# Patient Record
Sex: FEMALE | URBAN - METROPOLITAN AREA
[De-identification: names, ages, dates, MRNs, and addresses within clinical notes are randomized per-mention and may not be internally consistent; named-entity substitution may affect disease eponyms.]

---

## 2024-01-04 ENCOUNTER — APPOINTMENT (RX ONLY)
Dept: URBAN - METROPOLITAN AREA CLINIC 111 | Facility: CLINIC | Age: 69
Setting detail: DERMATOLOGY
End: 2024-01-04

## 2024-01-04 ENCOUNTER — RX ONLY (OUTPATIENT)
Age: 69
Setting detail: RX ONLY
End: 2024-01-04

## 2024-01-04 DIAGNOSIS — L24 IRRITANT CONTACT DERMATITIS: ICD-10-CM

## 2024-01-04 DIAGNOSIS — L82.1 OTHER SEBORRHEIC KERATOSIS: ICD-10-CM

## 2024-01-04 DIAGNOSIS — L91.8 OTHER HYPERTROPHIC DISORDERS OF THE SKIN: ICD-10-CM

## 2024-01-04 PROBLEM — L24.9 IRRITANT CONTACT DERMATITIS, UNSPECIFIED CAUSE: Status: ACTIVE | Noted: 2024-01-04

## 2024-01-04 PROCEDURE — ? COUNSELING

## 2024-01-04 PROCEDURE — ? SKIN TAG REMOVAL

## 2024-01-04 PROCEDURE — ? PRESCRIPTION MEDICATION MANAGEMENT

## 2024-01-04 PROCEDURE — 99203 OFFICE O/P NEW LOW 30 MIN: CPT | Mod: 25

## 2024-01-04 PROCEDURE — ? ORDER FOR PATCH TESTING

## 2024-01-04 PROCEDURE — ? PRESCRIPTION

## 2024-01-04 PROCEDURE — ? DIAGNOSIS COMMENT

## 2024-01-04 PROCEDURE — 11200 RMVL SKIN TAGS UP TO&INC 15: CPT

## 2024-01-04 RX ORDER — PIMECROLIMUS 10 MG/G
CREAM TOPICAL
Qty: 30 | Refills: 1 | Status: CANCELLED | COMMUNITY
Start: 2024-01-04

## 2024-01-04 RX ORDER — TACROLIMUS 1 MG/G
OINTMENT TOPICAL
Qty: 30 | Refills: 1 | Status: ERX | COMMUNITY
Start: 2024-01-04

## 2024-01-04 RX ADMIN — PIMECROLIMUS: 10 CREAM TOPICAL at 00:00

## 2024-01-04 ASSESSMENT — LOCATION DETAILED DESCRIPTION DERM
LOCATION DETAILED: RIGHT INFERIOR ANTERIOR NECK
LOCATION DETAILED: RIGHT MEDIAL SUPERIOR EYELID
LOCATION DETAILED: RIGHT INFERIOR LATERAL NECK
LOCATION DETAILED: LEFT LATERAL MALAR CHEEK
LOCATION DETAILED: LEFT INFERIOR ANTERIOR NECK
LOCATION DETAILED: LEFT INFERIOR LATERAL NECK
LOCATION DETAILED: RIGHT INFERIOR CENTRAL MALAR CHEEK

## 2024-01-04 ASSESSMENT — LOCATION ZONE DERM
LOCATION ZONE: NECK
LOCATION ZONE: EYELID
LOCATION ZONE: FACE

## 2024-01-04 ASSESSMENT — LOCATION SIMPLE DESCRIPTION DERM
LOCATION SIMPLE: RIGHT SUPERIOR EYELID
LOCATION SIMPLE: LEFT CHEEK
LOCATION SIMPLE: RIGHT ANTERIOR NECK
LOCATION SIMPLE: RIGHT CHEEK
LOCATION SIMPLE: LEFT ANTERIOR NECK

## 2024-01-04 NOTE — HPI: SKIN LESION
Is This A New Presentation, Or A Follow-Up?: Skin Lesions
What Type Of Note Output Would You Prefer (Optional)?: Bullet Format
How Severe Is Your Skin Lesion?: moderate
Has Your Skin Lesion Been Treated?: not been treated
Additional History: Patient would like to get skin tags removed.

## 2024-01-04 NOTE — PROCEDURE: DIAGNOSIS COMMENT
Render Risk Assessment In Note?: no
Comment: Recommended patient stop using her makeup removing wipes and double rinse in the shower. Plan to complete patch testing to determine irritants.
Detail Level: Simple

## 2024-01-04 NOTE — PROCEDURE: SKIN TAG REMOVAL
Medical Necessity Clause: This procedure was medically necessary because the lesions that were treated were:
Include Z78.9 (Other Specified Conditions Influencing Health Status) As An Associated Diagnosis?: No
Anesthesia Type: 1% lidocaine with epinephrine
Consent: Written consent obtained and the risks of skin tag removal was reviewed with the patient including but not limited to bleeding, pigmentary change, infection, pain, and remote possibility of scarring.
Add Associated Diagnoses If Applicable When Selecting Medical Necessity: Yes
Anesthesia Volume In Cc: 0.2
Medical Necessity Information: It is in your best interest to select a reason for this procedure from the list below. All of these items fulfill various CMS LCD requirements except the new and changing color options.
Hemostasis: Drysol
Detail Level: Detailed

## 2024-01-04 NOTE — HPI: RASH
What Type Of Note Output Would You Prefer (Optional)?: Bullet Format
How Severe Is Your Rash?: moderate
Is This A New Presentation, Or A Follow-Up?: Rash
Additional History: Patient reports no change in regimen. Patient previously moved to a brand new apartment in August with , did not have rash outbreak until November. \\n\\nPatient has seen allergist and was prescribed ointments with some improvement.

## 2024-01-04 NOTE — PROCEDURE: ORDER FOR PATCH TESTING
Detail Level: Simple
Counseling: I discussed the timing of the procedure and ensured the patient understands that this test requires multiple visits. No topical steroids applied should be applied to the patch testing location and no oral prednisone for two week prior to the test. While the patches are in place they should be kept dry which will limit bathing, swimming an exercise. I also explained that it is common for testing to be negative and this doesn't mean there isn't a allergic reaction occurring. During the testing itching is common.
Location Patches Should Be Applied: Back
Patch Test To Be Applied: [employer] American 80 Series
Patch Test Reading Schedule: First Reading at 48 hours and Second Reading at 72 hours

## 2024-01-04 NOTE — PROCEDURE: PRESCRIPTION MEDICATION MANAGEMENT
Initiate Treatment: -\\n-Elidel 1 % topical cream: Apply to the affected areas twice daily as needed for flares. If stinging occurs, refrigerate beforehand or dilute with CeraVe healing ointment.
Detail Level: Zone
Render In Strict Bullet Format?: No

## 2024-01-22 ENCOUNTER — APPOINTMENT (RX ONLY)
Dept: URBAN - METROPOLITAN AREA CLINIC 111 | Facility: CLINIC | Age: 69
Setting detail: DERMATOLOGY
End: 2024-01-22

## 2024-01-22 ENCOUNTER — RX ONLY (OUTPATIENT)
Age: 69
Setting detail: RX ONLY
End: 2024-01-22

## 2024-01-22 DIAGNOSIS — L24 IRRITANT CONTACT DERMATITIS: ICD-10-CM

## 2024-01-22 PROBLEM — L24.9 IRRITANT CONTACT DERMATITIS, UNSPECIFIED CAUSE: Status: ACTIVE | Noted: 2024-01-22

## 2024-01-22 PROCEDURE — ? COUNSELING

## 2024-01-22 PROCEDURE — 95044 PATCH/APPLICATION TESTS: CPT

## 2024-01-22 PROCEDURE — ? PATCH TESTING

## 2024-01-22 PROCEDURE — ? PRESCRIPTION MEDICATION MANAGEMENT

## 2024-01-22 RX ORDER — TACROLIMUS 1 MG/G
OINTMENT TOPICAL
Qty: 30 | Refills: 1 | Status: ERX

## 2024-01-24 ENCOUNTER — APPOINTMENT (RX ONLY)
Dept: URBAN - METROPOLITAN AREA CLINIC 111 | Facility: CLINIC | Age: 69
Setting detail: DERMATOLOGY
End: 2024-01-24

## 2024-01-24 DIAGNOSIS — L24 IRRITANT CONTACT DERMATITIS: ICD-10-CM

## 2024-01-24 PROBLEM — L24.9 IRRITANT CONTACT DERMATITIS, UNSPECIFIED CAUSE: Status: ACTIVE | Noted: 2024-01-24

## 2024-01-24 PROCEDURE — ? COUNSELING

## 2024-01-24 PROCEDURE — 99213 OFFICE O/P EST LOW 20 MIN: CPT

## 2024-01-24 PROCEDURE — ? PRESCRIPTION MEDICATION MANAGEMENT

## 2024-01-24 PROCEDURE — ? NORTH AMERICAN 80 PATCH TEST READING

## 2024-01-24 NOTE — PROCEDURE: PRESCRIPTION MEDICATION MANAGEMENT
Detail Level: Zone
Continue Regimen: -\\n- Elidel 1 % topical cream: Apply to the affected areas twice daily as needed for flares. If stinging occurs, refrigerate beforehand or dilute with CeraVe healing ointment.
Render In Strict Bullet Format?: No

## 2024-01-24 NOTE — PROCEDURE: [employer]ICAN 80 PATCH TEST READING
Name Of Allergen 8: carba mix
Name Of Allergen 43: Cobalt (II) chloride hexahydrate
Name Of Allergen 63: Iodopropynyl butyl carbamate
Allergen 49 Reaction: no reaction
Name Of Allergen 77: Formaldehyde
Name Of Allergen 31: Sesquiterpene lactone mix
Name Of Allergen 19: Myroxylon pereirae resin/(Balsam [place])
Name Of Allergen 70: Benzoyl peroxide
Name Of Allergen 50: Fragrance Mix II
Name Of Allergen 15: 7-lflz-Btqzojzogbojosgwkxxocgw resin
Name Of Allergen 3: Colophonium/(Colophony)
Name Of Allergen 38: Gold(I)sodium thiosulfate dihydrate
Name Of Allergen 72: Hydroxyisohexyl 3-Cyclohexene Carboxaldehyde (Lyral)
Name Of Allergen 26: Paraben mix
Name Of Allergen 57: Cananga odorata oil/(Ylang-Ylang oil)
Name Of Allergen 45: B-033A Budesonide
Name Of Allergen 10: Thiuram mix
Name Of Allergen 65: Disperse Blue 106/124 Mix
Name Of Allergen 79: Propylene glycol
Detail Level: Zone
Name Of Allergen 33: Propolis
Name Of Allergen 21: Diazolidinylurea (Germall II)
Name Of Allergen 52: Benzyl salicylate
Allergen 27 Reaction: 1+
What Reading Time Point?: 48 hour
Name Of Allergen 11: Clobetasol-17-propionate
Name Of Allergen 66: Compositae Mix II
Name Of Allergen 46: Cocamide SEBASTIAN/(Coconut diethanolamide)
Name Of Allergen 80: Oleamidopropyl dimethylamine
Allergen 17 Reaction: +/-
Name Of Allergen 34: Benzophenone-3/ (2-Hydroxy-4-methoxybenzophenone)
Name Of Allergen 22: Tocopherol/ (DL alpha tocopherol)
Name Of Allergen 53: Decyl Glucoside
Show Negative Results In The Note?: No
Name Of Allergen 41: Toluenesulfonamide formaldehyde resin
Name Of Allergen 6: Cinnamal/(Cinnamic aldehyde)
Name Of Allergen 61: Desoximetasone
Name Of Allergen 75: Amidoamine
Name Of Allergen 29: Glutaral / (Glutaraldehyde)
Show Allergen Counseling In The Note?: Yes
Allergen 2 Counseling: Allergen information sheets including equivocals provided to patient.
Name Of Allergen 60: Hydroperoxides of Limonene
Name Of Allergen 13: Epoxy resin Bisphenol A
Name Of Allergen 68: Fusidic acid sodium salt
Name Of Allergen 48: Textile dye mix
Name Of Allergen 36: Ethyleneurea, melamine formaldehyde mix
Name Of Allergen 1: Benzocaine
Name Of Allergen 24: Mixed dialkyl thiourea
Name Of Allergen 55: Hema (2-Hydroxyethyl methacrylate)
Name Of Allergen 69: Dibucaine hydrochloride
Name Of Allergen 49: Tea Tree Oil
Name Of Allergen 14: Quaternium-15(Dowicil 200)/(1-(30Chloroallyl)-3,5,5-cmvthc-2-azoniaadamantane chloride)
Name Of Allergen 37: 2-tert-Butyl-4-methoxyphenol (BHA)
Name Of Allergen 2: 2-Mercaptobenzothiazole(MBT)
Name Of Allergen 71: Isoamyl-p-methoxycinnamate
Name Of Allergen 25: Disperse Orange 3
Name Of Allergen 56: DMDM Hydantoin
Name Of Allergen 44: Tixocortol-21-pivalate
Name Of Allergen 9: Neomycin sulfate
Name Of Allergen 64: 2-n-Octyl-4-isothiazolin-3-one
Name Of Allergen 78: Methylisothiazolinone + Methylchloroisothiazolinone/(Cl+-Me-isothiazolinone(Arya )
Name Of Allergen 32: Thimerosal (Merthiolate)
Name Of Allergen 20: Nickelsulfate hexahydrate
Name Of Allergen 51: Disperse Yellow 3
Name Of Allergen 16: Mercapto mix
Name Of Allergen 4: p-Phenylenediamine(PPD)
Name Of Allergen 39: Ethyl acrylate
Name Of Allergen 73: Ethylhexyl Salicylate
Name Of Allergen 27: Methyldibromo glutaronitrile (MDBGN)
Name Of Allergen 58: Benzyl alcohol
Name Of Allergen 17: N,N-Diphenylguanidine
Name Of Allergen 5: Imidazolidinyl urea Germall 115
Name Of Allergen 40: Glyceryl monothioglycolate (GMTG)
Name Of Allergen 74: Hydroperoxides of Linalool
Name Of Allergen 28: Fragrance mix
Number Of Patches Read: 80
Name Of Allergen 59: Isopropyl myristate
Name Of Allergen 12: Ethylenediamine dihydrochloride
Name Of Allergen 67: Lidocaine
Name Of Allergen 47: Triethanolamine
Allergen 1 Counseling: Patch test safe list emailed to patient with positive reactions.
Name Of Allergen 35: Chloroxylenol (PCMX) / (4-Chloro-3,5-xylenol(PCMX)
Name Of Allergen 23: Bacitracin
Name Of Allergen 54: Methylisothiazolinone
Name Of Allergen 7: Amerchol L101
Name Of Allergen 42: Methyl methacrylate
Name Of Allergen 62: Polysorbate 80 / (Polyoxyethylenesorbitanmonooleate(Tween 80)
Name Of Allergen 76: Cocamidopropylbetaine
Name Of Allergen 30: 2-Bromo-2-nitropropane-l,3-diol (Brononol)
Name Of Allergen 18: Potassium dichromate

## 2024-01-26 ENCOUNTER — APPOINTMENT (RX ONLY)
Dept: URBAN - METROPOLITAN AREA CLINIC 111 | Facility: CLINIC | Age: 69
Setting detail: DERMATOLOGY
End: 2024-01-26

## 2024-01-26 DIAGNOSIS — L24 IRRITANT CONTACT DERMATITIS: ICD-10-CM

## 2024-01-26 PROBLEM — L24.9 IRRITANT CONTACT DERMATITIS, UNSPECIFIED CAUSE: Status: ACTIVE | Noted: 2024-01-26

## 2024-01-26 PROCEDURE — ? COUNSELING

## 2024-01-26 PROCEDURE — 99213 OFFICE O/P EST LOW 20 MIN: CPT

## 2024-01-26 PROCEDURE — ? NORTH AMERICAN 80 PATCH TEST READING

## 2024-01-26 PROCEDURE — ? PRESCRIPTION MEDICATION MANAGEMENT

## 2024-01-26 NOTE — PROCEDURE: MIPS QUALITY
Quality 130: Documentation Of Current Medications In The Medical Record: Current Medications Documented
Detail Level: Detailed
Quality 111:Pneumonia Vaccination Status For Older Adults: Patient did not receive any pneumococcal conjugate or polysaccharide vaccine on or after their 60th birthday and before the end of the measurement period
Quality 47: Advance Care Plan: Advance Care Planning discussed and documented; advance care plan or surrogate decision maker documented in the medical record.
Quality 110: Preventive Care And Screening: Influenza Immunization: Influenza Immunization Administered during Influenza season

## 2024-01-26 NOTE — PROCEDURE: [employer] 80 PATCH TEST READING
Name Of Allergen 29: Glutaral / (Glutaraldehyde)
Name Of Allergen 6: Cinnamal/(Cinnamic aldehyde)
Allergen 47 Reaction: no reaction
Name Of Allergen 35: Chloroxylenol (PCMX) / (4-Chloro-3,5-xylenol(PCMX)
Name Of Allergen 55: Hema (2-Hydroxyethyl methacrylate)
Show Negative Results In The Note?: No
Name Of Allergen 41: Toluenesulfonamide formaldehyde resin
Name Of Allergen 61: Desoximetasone
Name Of Allergen 67: Lidocaine
Name Of Allergen 47: Triethanolamine
Name Of Allergen 73: Ethylhexyl Salicylate
Number Of Patches Read: 80
Name Of Allergen 16: Mercapto mix
Name Of Allergen 22: Tocopherol/ (DL alpha tocopherol)
Name Of Allergen 78: Methylisothiazolinone + Methylchloroisothiazolinone/(Cl+-Me-isothiazolinone(Arya )
Name Of Allergen 28: Fragrance mix
Name Of Allergen 5: Imidazolidinyl urea Germall 115
Name Of Allergen 34: Benzophenone-3/ (2-Hydroxy-4-methoxybenzophenone)
Name Of Allergen 54: Methylisothiazolinone
What Reading Time Point?: 48 hour
Name Of Allergen 40: Glyceryl monothioglycolate (GMTG)
Name Of Allergen 60: Hydroperoxides of Limonene
Name Of Allergen 66: Compositae Mix II
Name Of Allergen 46: Cocamide SEBASTIAN/(Coconut diethanolamide)
Allergen 27 Reaction: 3+
Name Of Allergen 72: Hydroxyisohexyl 3-Cyclohexene Carboxaldehyde (Lyral)
Name Of Allergen 15: 0-uzyn-Oceykmjhnhtuwnxveudqnyc resin
Detail Level: Zone
Name Of Allergen 21: Diazolidinylurea (Germall II)
Name Of Allergen 77: Formaldehyde
Name Of Allergen 4: p-Phenylenediamine(PPD)
Name Of Allergen 27: Methyldibromo glutaronitrile (MDBGN)
Name Of Allergen 33: Propolis
Allergen 65 Reaction: +/-
Name Of Allergen 10: Thiuram mix
Name Of Allergen 59: Isopropyl myristate
Name Of Allergen 39: Ethyl acrylate
Name Of Allergen 45: B-033A Budesonide
Name Of Allergen 65: Disperse Blue 106/124 Mix
Name Of Allergen 71: Isoamyl-p-methoxycinnamate
Name Of Allergen 14: Quaternium-15(Dowicil 200)/(1-(30Chloroallyl)-3,5,4-hejjsp-5-azoniaadamantane chloride)
Name Of Allergen 50: Fragrance Mix II
Name Of Allergen 20: Nickelsulfate hexahydrate
Name Of Allergen 76: Cocamidopropylbetaine
Name Of Allergen 26: Paraben mix
Name Of Allergen 3: Colophonium/(Colophony)
Name Of Allergen 32: Thimerosal (Merthiolate)
Name Of Allergen 9: Neomycin sulfate
Name Of Allergen 58: Benzyl alcohol
Name Of Allergen 38: Gold(I)sodium thiosulfate dihydrate
Name Of Allergen 44: Tixocortol-21-pivalate
Name Of Allergen 64: 2-n-Octyl-4-isothiazolin-3-one
Name Of Allergen 70: Benzoyl peroxide
Name Of Allergen 51: Disperse Yellow 3
Name Of Allergen 13: Epoxy resin Bisphenol A
Allergen 2 Counseling: Allergen information sheets including equivocals provided to patient.
Name Of Allergen 19: Myroxylon pereirae resin/(Balsam [place])
Name Of Allergen 2: 2-Mercaptobenzothiazole(MBT)
Name Of Allergen 25: Disperse Orange 3
Allergen 1 Counseling: Patch test safe list emailed to patient with positive reactions.
Name Of Allergen 31: Sesquiterpene lactone mix
Name Of Allergen 8: carba mix
Name Of Allergen 57: Cananga odorata oil/(Ylang-Ylang oil)
Name Of Allergen 37: 2-tert-Butyl-4-methoxyphenol (BHA)
Name Of Allergen 63: Iodopropynyl butyl carbamate
Name Of Allergen 43: Cobalt (II) chloride hexahydrate
Name Of Allergen 49: Tea Tree Oil
Name Of Allergen 69: Dibucaine hydrochloride
Name Of Allergen 52: Benzyl salicylate
Name Of Allergen 12: Ethylenediamine dihydrochloride
Name Of Allergen 75: Amidoamine
Name Of Allergen 18: Potassium dichromate
Name Of Allergen 1: Benzocaine
Name Of Allergen 24: Mixed dialkyl thiourea
Name Of Allergen 80: Oleamidopropyl dimethylamine
Name Of Allergen 30: 2-Bromo-2-nitropropane-l,3-diol (Brononol)
Name Of Allergen 7: Amerchol L101
Name Of Allergen 56: DMDM Hydantoin
Name Of Allergen 36: Ethyleneurea, melamine formaldehyde mix
Name Of Allergen 42: Methyl methacrylate
Name Of Allergen 62: Polysorbate 80 / (Polyoxyethylenesorbitanmonooleate(Tween 80)
Name Of Allergen 68: Fusidic acid sodium salt
Show Allergen Counseling In The Note?: Yes
Name Of Allergen 48: Textile dye mix
Name Of Allergen 74: Hydroperoxides of Linalool
Name Of Allergen 11: Clobetasol-17-propionate
Name Of Allergen 53: Decyl Glucoside
Name Of Allergen 17: N,N-Diphenylguanidine
Name Of Allergen 23: Bacitracin
Name Of Allergen 79: Propylene glycol